# Patient Record
Sex: FEMALE | Race: WHITE
[De-identification: names, ages, dates, MRNs, and addresses within clinical notes are randomized per-mention and may not be internally consistent; named-entity substitution may affect disease eponyms.]

---

## 2018-09-05 ENCOUNTER — HOSPITAL ENCOUNTER (EMERGENCY)
Age: 23
Discharge: HOME | End: 2018-09-05

## 2018-09-05 ENCOUNTER — HOSPITAL ENCOUNTER (EMERGENCY)
Dept: HOSPITAL 91 - FTE | Age: 23
Discharge: HOME | End: 2018-09-05
Payer: MEDICAID

## 2018-09-05 DIAGNOSIS — O23.41: ICD-10-CM

## 2018-09-05 DIAGNOSIS — Z3A.01: ICD-10-CM

## 2018-09-05 DIAGNOSIS — O26.891: Primary | ICD-10-CM

## 2018-09-05 DIAGNOSIS — R10.2: ICD-10-CM

## 2018-09-05 LAB
ADD MAN DIFF?: NO
ADD UMIC: YES
BASOPHIL #: 0 10^3/UL (ref 0–0.1)
BASOPHILS %: 0.5 % (ref 0–2)
EOSINOPHILS #: 0.1 10^3/UL (ref 0–0.5)
EOSINOPHILS %: 1.4 % (ref 0–7)
HEMATOCRIT: 38.3 % (ref 37–47)
HEMOGLOBIN: 12.4 G/DL (ref 12–16)
IMMATURE GRANS #M: 0.02 10^3/UL (ref 0–0.03)
IMMATURE GRANS % (M): 0.2 % (ref 0–0.43)
INR: 0.92
LYMPHOCYTES #: 3.6 10^3/UL (ref 0.8–2.9)
LYMPHOCYTES %: 41.3 % (ref 15–51)
MEAN CORPUSCULAR HEMOGLOBIN: 30 PG (ref 29–33)
MEAN CORPUSCULAR HGB CONC: 32.4 G/DL (ref 32–37)
MEAN CORPUSCULAR VOLUME: 92.5 FL (ref 82–101)
MEAN PLATELET VOLUME: 10.9 FL (ref 7.4–10.4)
MONOCYTE #: 1 10^3/UL (ref 0.3–0.9)
MONOCYTES %: 11 % (ref 0–11)
NEUTROPHIL #: 3.9 10^3/UL (ref 1.6–7.5)
NEUTROPHILS %: 45.6 % (ref 39–77)
NUCLEATED RED BLOOD CELLS #: 0 10^3/UL (ref 0–0)
NUCLEATED RED BLOOD CELLS%: 0 /100WBC (ref 0–0)
PARTIAL THROMBOPLASTIN TIME: 27.4 SEC (ref 25–35)
PLATELET COUNT: 310 10^3/UL (ref 140–415)
PROTIME: 12.4 SEC (ref 11.9–14.9)
PT RATIO: 1
RED BLOOD COUNT: 4.14 10^6/UL (ref 4.2–5.4)
RED CELL DISTRIBUTION WIDTH: 13.1 % (ref 11.5–14.5)
UR ASCORBIC ACID: NEGATIVE MG/DL
UR BACTERIA: (no result) /HPF
UR BILIRUBIN (DIP): NEGATIVE MG/DL
UR BLOOD (DIP): (no result) MG/DL
UR CLARITY: (no result)
UR COLOR: YELLOW
UR GLUCOSE (DIP): NEGATIVE MG/DL
UR KETONES (DIP): (no result) MG/DL
UR LEUKOCYTE ESTERASE (DIP): (no result) LEU/UL
UR MUCUS: (no result) /HPF
UR NITRITE (DIP): NEGATIVE MG/DL
UR PH (DIP): 5 (ref 5–9)
UR RBC: 5 /HPF (ref 0–5)
UR SPECIFIC GRAVITY (DIP): 1.02 (ref 1–1.03)
UR SQUAMOUS EPITHELIAL CELL: (no result) /HPF
UR TOTAL PROTEIN (DIP): NEGATIVE MG/DL
UR UROBILINOGEN (DIP): NEGATIVE MG/DL
UR WBC: 27 /HPF (ref 0–5)
WHITE BLOOD COUNT: 8.6 10^3/UL (ref 4.8–10.8)

## 2018-09-05 PROCEDURE — 84702 CHORIONIC GONADOTROPIN TEST: CPT

## 2018-09-05 PROCEDURE — 87086 URINE CULTURE/COLONY COUNT: CPT

## 2018-09-05 PROCEDURE — 36415 COLL VENOUS BLD VENIPUNCTURE: CPT

## 2018-09-05 PROCEDURE — 86901 BLOOD TYPING SEROLOGIC RH(D): CPT

## 2018-09-05 PROCEDURE — 86900 BLOOD TYPING SEROLOGIC ABO: CPT

## 2018-09-05 PROCEDURE — 85730 THROMBOPLASTIN TIME PARTIAL: CPT

## 2018-09-05 PROCEDURE — 99284 EMERGENCY DEPT VISIT MOD MDM: CPT

## 2018-09-05 PROCEDURE — 85610 PROTHROMBIN TIME: CPT

## 2018-09-05 PROCEDURE — 81001 URINALYSIS AUTO W/SCOPE: CPT

## 2018-09-05 PROCEDURE — 76801 OB US < 14 WKS SINGLE FETUS: CPT

## 2018-09-05 PROCEDURE — 85025 COMPLETE CBC W/AUTO DIFF WBC: CPT

## 2018-09-05 RX ADMIN — ACETAMINOPHEN 1 MG: 325 TABLET, FILM COATED ORAL at 20:24

## 2018-09-05 RX ADMIN — METOCLOPRAMIDE HYDROCHLORIDE 1 MG: 10 TABLET ORAL at 21:10

## 2018-09-08 ENCOUNTER — HOSPITAL ENCOUNTER (INPATIENT)
Dept: HOSPITAL 91 - PP2 | Age: 23
LOS: 5 days | Discharge: HOME | DRG: 781 | End: 2018-09-13
Payer: MEDICAID

## 2018-09-08 DIAGNOSIS — O21.1: Primary | ICD-10-CM

## 2018-09-08 DIAGNOSIS — E86.0: ICD-10-CM

## 2018-09-08 DIAGNOSIS — O99.351: ICD-10-CM

## 2018-09-08 DIAGNOSIS — G43.909: ICD-10-CM

## 2018-09-08 DIAGNOSIS — Z3A.01: ICD-10-CM

## 2018-09-08 DIAGNOSIS — R94.6: ICD-10-CM

## 2018-09-08 LAB
ADD UMIC: NO
ANION GAP: 13 (ref 8–16)
BLOOD UREA NITROGEN: 8 MG/DL (ref 7–20)
CALCIUM: 9.5 MG/DL (ref 8.4–10.2)
CARBON DIOXIDE: 26 MMOL/L (ref 21–31)
CHLORIDE: 102 MMOL/L (ref 97–110)
CREATININE: 0.41 MG/DL (ref 0.44–1)
FREE T3: 3.89 PG/ML (ref 2.77–5.27)
FREE THYROXINE INDEX (CALC): 4.06 UG/ML (ref 0.65–3.89)
GLUCOSE: 80 MG/DL (ref 70–220)
POTASSIUM: 4 MMOL/L (ref 3.5–5.1)
SODIUM: 137 MMOL/L (ref 135–144)
T3 UPTAKE: 25.4 % (ref 23.5–40.5)
T4 (THYROXINE): 16 UG/DL (ref 5.5–11)
THYROID STIMULATING HORMONE: 0.07 MIU/L (ref 0.47–4.68)
UR ASCORBIC ACID: NEGATIVE MG/DL
UR BILIRUBIN (DIP): NEGATIVE MG/DL
UR BLOOD (DIP): NEGATIVE MG/DL
UR CLARITY: CLEAR
UR COLOR: YELLOW
UR GLUCOSE (DIP): NEGATIVE MG/DL
UR KETONES (DIP): (no result) MG/DL
UR LEUKOCYTE ESTERASE (DIP): NEGATIVE LEU/UL
UR NITRITE (DIP): NEGATIVE MG/DL
UR PH (DIP): 7 (ref 5–9)
UR SPECIFIC GRAVITY (DIP): 1.01 (ref 1–1.03)
UR TOTAL PROTEIN (DIP): NEGATIVE MG/DL
UR UROBILINOGEN (DIP): NEGATIVE MG/DL

## 2018-09-08 PROCEDURE — 80053 COMPREHEN METABOLIC PANEL: CPT

## 2018-09-08 PROCEDURE — 84439 ASSAY OF FREE THYROXINE: CPT

## 2018-09-08 PROCEDURE — 86376 MICROSOMAL ANTIBODY EACH: CPT

## 2018-09-08 PROCEDURE — 81003 URINALYSIS AUTO W/O SCOPE: CPT

## 2018-09-08 PROCEDURE — 99217: CPT

## 2018-09-08 PROCEDURE — 86800 THYROGLOBULIN ANTIBODY: CPT

## 2018-09-08 PROCEDURE — 84436 ASSAY OF TOTAL THYROXINE: CPT

## 2018-09-08 PROCEDURE — 80048 BASIC METABOLIC PNL TOTAL CA: CPT

## 2018-09-08 PROCEDURE — 84481 FREE ASSAY (FT-3): CPT

## 2018-09-08 PROCEDURE — 84479 ASSAY OF THYROID (T3 OR T4): CPT

## 2018-09-08 PROCEDURE — 87522 HEPATITIS C REVRS TRNSCRPJ: CPT

## 2018-09-08 PROCEDURE — 84443 ASSAY THYROID STIM HORMONE: CPT

## 2018-09-08 PROCEDURE — 84235 ASSAY OF ENDOCRINE HORMONE: CPT

## 2018-09-08 RX ADMIN — DEXTROSE, SODIUM CHLORIDE, AND POTASSIUM CHLORIDE 1 MLS/HR: 5; .45; .15 INJECTION INTRAVENOUS at 13:44

## 2018-09-08 RX ADMIN — FAMOTIDINE 1 MG: 10 INJECTION, SOLUTION INTRAVENOUS at 15:34

## 2018-09-08 RX ADMIN — FAMOTIDINE 1 MG: 10 INJECTION, SOLUTION INTRAVENOUS at 23:46

## 2018-09-08 RX ADMIN — PYRIDOXINE HCL TAB 50 MG 1 MG: 50 TAB at 23:46

## 2018-09-08 RX ADMIN — ONDANSETRON HYDROCHLORIDE 1 MG: 2 INJECTION, SOLUTION INTRAMUSCULAR; INTRAVENOUS at 21:56

## 2018-09-08 RX ADMIN — DEXTROSE, SODIUM CHLORIDE, AND POTASSIUM CHLORIDE 1 MLS/HR: 5; .45; .15 INJECTION INTRAVENOUS at 21:52

## 2018-09-08 RX ADMIN — FAMOTIDINE 1 MG: 10 INJECTION, SOLUTION INTRAVENOUS at 18:54

## 2018-09-09 RX ADMIN — FAMOTIDINE 1 MG: 10 INJECTION, SOLUTION INTRAVENOUS at 05:59

## 2018-09-09 RX ADMIN — METOCLOPRAMIDE HYDROCHLORIDE 1 MG: 10 INJECTION, SOLUTION INTRAMUSCULAR; INTRAVENOUS at 18:10

## 2018-09-09 RX ADMIN — PYRIDOXINE HCL TAB 50 MG 1 MG: 50 TAB at 18:10

## 2018-09-09 RX ADMIN — THIAMINE HYDROCHLORIDE 1 MLS/HR: 100 INJECTION, SOLUTION INTRAMUSCULAR; INTRAVENOUS at 09:52

## 2018-09-09 RX ADMIN — DEXTROSE, SODIUM CHLORIDE, AND POTASSIUM CHLORIDE 1 MLS/HR: 5; .45; .15 INJECTION INTRAVENOUS at 12:34

## 2018-09-09 RX ADMIN — PYRIDOXINE HCL TAB 50 MG 1 MG: 50 TAB at 12:34

## 2018-09-09 RX ADMIN — METOCLOPRAMIDE HYDROCHLORIDE 1 MG: 10 INJECTION, SOLUTION INTRAMUSCULAR; INTRAVENOUS at 12:36

## 2018-09-09 RX ADMIN — DEXTROSE, SODIUM CHLORIDE, AND POTASSIUM CHLORIDE 1 MLS/HR: 5; .45; .15 INJECTION INTRAVENOUS at 05:59

## 2018-09-09 RX ADMIN — METOCLOPRAMIDE HYDROCHLORIDE 1 MG: 10 INJECTION, SOLUTION INTRAMUSCULAR; INTRAVENOUS at 02:35

## 2018-09-09 RX ADMIN — FAMOTIDINE 1 MG: 10 INJECTION, SOLUTION INTRAVENOUS at 18:10

## 2018-09-09 RX ADMIN — PYRIDOXINE HCL TAB 50 MG 1 MG: 50 TAB at 06:00

## 2018-09-09 RX ADMIN — SODIUM CHLORIDE 1 MLS/HR: 9 INJECTION, SOLUTION INTRAVENOUS at 09:52

## 2018-09-09 RX ADMIN — FAMOTIDINE 1 MG: 10 INJECTION, SOLUTION INTRAVENOUS at 12:33

## 2018-09-10 ENCOUNTER — HOSPITAL ENCOUNTER (INPATIENT)
Age: 23
LOS: 3 days | Discharge: HOME | DRG: 781 | End: 2018-09-13

## 2018-09-10 LAB
ALANINE AMINOTRANSFERASE: 37 IU/L (ref 13–69)
ALBUMIN/GLOBULIN RATIO: 1
ALBUMIN: 3.3 G/DL (ref 3.3–4.9)
ALKALINE PHOSPHATASE: 63 IU/L (ref 42–121)
ANION GAP: 12 (ref 8–16)
ASPARTATE AMINO TRANSFERASE: 24 IU/L (ref 15–46)
BILIRUBIN,DIRECT: 0 MG/DL (ref 0–0.2)
BILIRUBIN,TOTAL: 0.2 MG/DL (ref 0.2–1.3)
BLOOD UREA NITROGEN: < 2 MG/DL (ref 7–20)
CALCIUM: 8.9 MG/DL (ref 8.4–10.2)
CARBON DIOXIDE: 25 MMOL/L (ref 21–31)
CHLORIDE: 104 MMOL/L (ref 97–110)
CREATININE: 0.44 MG/DL (ref 0.44–1)
GLOBULIN: 3.3 G/DL (ref 1.3–3.2)
GLUCOSE: 100 MG/DL (ref 70–220)
POTASSIUM: 3.9 MMOL/L (ref 3.5–5.1)
SODIUM: 137 MMOL/L (ref 135–144)
TOTAL PROTEIN: 6.6 G/DL (ref 6.1–8.1)

## 2018-09-10 RX ADMIN — METOCLOPRAMIDE HYDROCHLORIDE 1 MG: 10 INJECTION, SOLUTION INTRAMUSCULAR; INTRAVENOUS at 19:25

## 2018-09-10 RX ADMIN — FAMOTIDINE 1 MG: 10 INJECTION, SOLUTION INTRAVENOUS at 00:09

## 2018-09-10 RX ADMIN — DEXTROSE, SODIUM CHLORIDE, AND POTASSIUM CHLORIDE 1 MLS/HR: 5; .45; .15 INJECTION INTRAVENOUS at 04:58

## 2018-09-10 RX ADMIN — PYRIDOXINE HCL TAB 50 MG 1 MG: 50 TAB at 11:45

## 2018-09-10 RX ADMIN — FAMOTIDINE 1 MG: 10 INJECTION, SOLUTION INTRAVENOUS at 05:41

## 2018-09-10 RX ADMIN — PYRIDOXINE HCL TAB 50 MG 1 MG: 50 TAB at 18:00

## 2018-09-10 RX ADMIN — PYRIDOXINE HCL TAB 50 MG 1 MG: 50 TAB at 00:09

## 2018-09-10 RX ADMIN — PYRIDOXINE HCL TAB 50 MG 1 MG: 50 TAB at 05:41

## 2018-09-10 RX ADMIN — DEXTROSE, SODIUM CHLORIDE, AND POTASSIUM CHLORIDE 1 MLS/HR: 5; .45; .15 INJECTION INTRAVENOUS at 12:58

## 2018-09-10 RX ADMIN — ONDANSETRON HYDROCHLORIDE 1 MG: 2 INJECTION, SOLUTION INTRAMUSCULAR; INTRAVENOUS at 21:14

## 2018-09-10 RX ADMIN — SODIUM CHLORIDE 1 MLS/HR: 9 INJECTION, SOLUTION INTRAVENOUS at 09:37

## 2018-09-10 RX ADMIN — DEXTROSE, SODIUM CHLORIDE, AND POTASSIUM CHLORIDE 1 MLS/HR: 5; .45; .15 INJECTION INTRAVENOUS at 21:14

## 2018-09-10 RX ADMIN — FAMOTIDINE 1 MG: 10 INJECTION, SOLUTION INTRAVENOUS at 19:25

## 2018-09-10 RX ADMIN — DEXTROSE, SODIUM CHLORIDE, AND POTASSIUM CHLORIDE 1 MLS/HR: 5; .45; .15 INJECTION INTRAVENOUS at 00:09

## 2018-09-10 RX ADMIN — FAMOTIDINE 1 MG: 10 INJECTION, SOLUTION INTRAVENOUS at 11:45

## 2018-09-10 RX ADMIN — THIAMINE HYDROCHLORIDE 1 MLS/HR: 100 INJECTION, SOLUTION INTRAMUSCULAR; INTRAVENOUS at 09:38

## 2018-09-11 LAB
FREE T3: 3.46 PG/ML (ref 2.77–5.27)
FREE T4 (FREE THYROXINE): 1.5 NG/DL (ref 0.79–2.35)
THYROGLOBULIN ANTIBODIES: 1 IU/ML
THYROID MICROSOMAL ANTIBODY: 7 IU/ML (ref ?–9)
THYROID STIMULATING HORMONE: 0.03 MIU/L (ref 0.47–4.68)

## 2018-09-11 RX ADMIN — FAMOTIDINE 1 MG: 10 INJECTION, SOLUTION INTRAVENOUS at 23:17

## 2018-09-11 RX ADMIN — DEXTROSE, SODIUM CHLORIDE, AND POTASSIUM CHLORIDE 1 MLS/HR: 5; .45; .15 INJECTION INTRAVENOUS at 23:17

## 2018-09-11 RX ADMIN — FAMOTIDINE 1 MG: 10 INJECTION, SOLUTION INTRAVENOUS at 17:29

## 2018-09-11 RX ADMIN — SODIUM CHLORIDE 1 MLS/HR: 9 INJECTION, SOLUTION INTRAVENOUS at 08:52

## 2018-09-11 RX ADMIN — FAMOTIDINE 1 MG: 10 INJECTION, SOLUTION INTRAVENOUS at 12:24

## 2018-09-11 RX ADMIN — PYRIDOXINE HCL TAB 50 MG 1 MG: 50 TAB at 00:24

## 2018-09-11 RX ADMIN — FAMOTIDINE 1 MG: 10 INJECTION, SOLUTION INTRAVENOUS at 00:24

## 2018-09-11 RX ADMIN — PYRIDOXINE HCL TAB 50 MG 1 MG: 50 TAB at 17:29

## 2018-09-11 RX ADMIN — THIAMINE HYDROCHLORIDE 1 MLS/HR: 100 INJECTION, SOLUTION INTRAMUSCULAR; INTRAVENOUS at 08:52

## 2018-09-11 RX ADMIN — DEXTROSE, SODIUM CHLORIDE, AND POTASSIUM CHLORIDE 1 MLS/HR: 5; .45; .15 INJECTION INTRAVENOUS at 12:58

## 2018-09-11 RX ADMIN — PYRIDOXINE HCL TAB 50 MG 1 MG: 50 TAB at 12:24

## 2018-09-11 RX ADMIN — DEXTROSE, SODIUM CHLORIDE, AND POTASSIUM CHLORIDE 1 MLS/HR: 5; .45; .15 INJECTION INTRAVENOUS at 05:18

## 2018-09-11 RX ADMIN — PYRIDOXINE HCL TAB 50 MG 1 MG: 50 TAB at 23:17

## 2018-09-11 RX ADMIN — PYRIDOXINE HCL TAB 50 MG 1 MG: 50 TAB at 05:18

## 2018-09-11 RX ADMIN — FAMOTIDINE 1 MG: 10 INJECTION, SOLUTION INTRAVENOUS at 05:18

## 2018-09-12 RX ADMIN — PYRIDOXINE HCL TAB 50 MG 1 MG: 50 TAB at 18:11

## 2018-09-12 RX ADMIN — PYRIDOXINE HCL TAB 50 MG 1 MG: 50 TAB at 12:32

## 2018-09-12 RX ADMIN — FAMOTIDINE 1 MG: 10 INJECTION, SOLUTION INTRAVENOUS at 12:32

## 2018-09-12 RX ADMIN — FAMOTIDINE 1 MG: 10 INJECTION, SOLUTION INTRAVENOUS at 18:11

## 2018-09-12 RX ADMIN — DEXTROSE, SODIUM CHLORIDE, AND POTASSIUM CHLORIDE 1 MLS/HR: 5; .45; .15 INJECTION INTRAVENOUS at 06:31

## 2018-09-12 RX ADMIN — FAMOTIDINE 1 MG: 10 INJECTION, SOLUTION INTRAVENOUS at 06:33

## 2018-09-12 RX ADMIN — PYRIDOXINE HCL TAB 50 MG 1 MG: 50 TAB at 06:31

## 2018-09-12 RX ADMIN — THIAMINE HYDROCHLORIDE 1 MLS/HR: 100 INJECTION, SOLUTION INTRAMUSCULAR; INTRAVENOUS at 08:25

## 2018-09-12 RX ADMIN — DEXTROSE, SODIUM CHLORIDE, AND POTASSIUM CHLORIDE 1 MLS/HR: 5; .45; .15 INJECTION INTRAVENOUS at 11:46

## 2018-09-12 RX ADMIN — DEXTROSE, SODIUM CHLORIDE, AND POTASSIUM CHLORIDE 1 MLS/HR: 5; .45; .15 INJECTION INTRAVENOUS at 18:50

## 2018-09-12 RX ADMIN — SODIUM CHLORIDE 1 MLS/HR: 9 INJECTION, SOLUTION INTRAVENOUS at 08:25

## 2018-09-13 RX ADMIN — PYRIDOXINE HCL TAB 50 MG 1 MG: 50 TAB at 00:48

## 2018-09-13 RX ADMIN — SODIUM CHLORIDE 1 MLS/HR: 9 INJECTION, SOLUTION INTRAVENOUS at 08:53

## 2018-09-13 RX ADMIN — FAMOTIDINE 1 MG: 10 INJECTION, SOLUTION INTRAVENOUS at 00:48

## 2018-09-13 RX ADMIN — THIAMINE HYDROCHLORIDE 1 MLS/HR: 100 INJECTION, SOLUTION INTRAMUSCULAR; INTRAVENOUS at 08:53

## 2018-09-13 RX ADMIN — FAMOTIDINE 1 MG: 10 INJECTION, SOLUTION INTRAVENOUS at 12:00

## 2018-09-13 RX ADMIN — FAMOTIDINE 1 MG: 10 INJECTION, SOLUTION INTRAVENOUS at 06:47

## 2018-09-13 RX ADMIN — PYRIDOXINE HCL TAB 50 MG 1 MG: 50 TAB at 12:00

## 2018-09-13 RX ADMIN — DEXTROSE, SODIUM CHLORIDE, AND POTASSIUM CHLORIDE 1 MLS/HR: 5; .45; .15 INJECTION INTRAVENOUS at 00:47

## 2018-09-13 RX ADMIN — DEXTROSE, SODIUM CHLORIDE, AND POTASSIUM CHLORIDE 1 MLS/HR: 5; .45; .15 INJECTION INTRAVENOUS at 12:57

## 2018-09-13 RX ADMIN — PYRIDOXINE HCL TAB 50 MG 1 MG: 50 TAB at 06:48

## 2018-09-13 RX ADMIN — DEXTROSE, SODIUM CHLORIDE, AND POTASSIUM CHLORIDE 1 MLS/HR: 5; .45; .15 INJECTION INTRAVENOUS at 04:58

## 2018-09-15 LAB — TSH RECEPTOR ANTIBODY: 8

## 2019-02-22 ENCOUNTER — HOSPITAL ENCOUNTER (OUTPATIENT)
Dept: HOSPITAL 10 - L-D | Age: 24
Discharge: HOME | End: 2019-02-22
Attending: OBSTETRICS & GYNECOLOGY
Payer: MEDICAID

## 2019-02-22 ENCOUNTER — HOSPITAL ENCOUNTER (OUTPATIENT)
Dept: HOSPITAL 91 - OBT | Age: 24
Discharge: HOME | End: 2019-02-22
Payer: MEDICAID

## 2019-02-22 VITALS
BODY MASS INDEX: 30.15 KG/M2 | HEIGHT: 57 IN | BODY MASS INDEX: 30.15 KG/M2 | WEIGHT: 139.77 LBS | HEIGHT: 57 IN | WEIGHT: 139.77 LBS

## 2019-02-22 VITALS — SYSTOLIC BLOOD PRESSURE: 97 MMHG | DIASTOLIC BLOOD PRESSURE: 54 MMHG | HEART RATE: 70 BPM | RESPIRATION RATE: 18 BRPM

## 2019-02-22 DIAGNOSIS — O09.293: Primary | ICD-10-CM

## 2019-02-22 DIAGNOSIS — Z3A.32: ICD-10-CM

## 2019-02-22 PROCEDURE — 76818 FETAL BIOPHYS PROFILE W/NST: CPT

## 2019-02-22 PROCEDURE — G0463 HOSPITAL OUTPT CLINIC VISIT: HCPCS

## 2019-02-22 NOTE — TRIAGE
===================================

OB Triage

===================================

Datetime Report Generated by CPN: 02/22/2019 21:27

   

   

===========================

Datetime: 02/22/2019 19:48

===========================

   

 Stage of Pregnancy:  OB Triage

 Monitor Mode:  External

 Quality:  Mild

 Pattern:  Normal: <= 5 Contractions in 10 Minutes

 Resting Tone [place]:  Relaxed

   

===================================

Fetal Heart Rate

===================================

   

 FHR Baseline Rate:  120

 Monitor Mode:  External US

 FHR Baseline Changes:  No Baseline Change

 Variability:  Moderate 6-25 bpm

 Accelerations:  15X15

 Decelerations:  None

 Category:  Category I

   

===========================

Datetime: 02/22/2019 19:18

===========================

   

 Stage of Pregnancy:  OB Triage

   

===================================

Labor Evaluation

===================================

   

 Frequency:  3-8

 Monitor Mode:  External

 Duration (sec)2399:  30-40

 Quality:  Mild

 Pattern:  Normal: <= 5 Contractions in 10 Minutes

 Resting Tone [place]:  Relaxed

   

===================================

Fetal Heart Rate

===================================

   

 FHR Baseline Rate:  130

 Monitor Mode:  External US

 FHR Baseline Changes:  No Baseline Change

 Variability:  Moderate 6-25 bpm

 Accelerations:  15X15

 Decelerations:  None

 Category:  Category I

   

===================================

Pain Assessment

===================================

   

 Pain Scale:  0

 Pain Presence:  None/Denies

 Pain Type:  N/A

 Pain Assessment Comments:  Pt denies discomfort or ucs

   

===========================

Datetime: 02/22/2019 18:49

===========================

   

   

===================================

Labor Evaluation

===================================

   

 Frequency:  x5

 Monitor Mode:  External

 Duration (sec)2399:  40-50

 Quality:  Mild

 Resting Tone [place]:  Relaxed

 Contraction Comments:  pt denies feeling feeling UCs

   

===================================

Fetal Heart Rate

===================================

   

 FHR Baseline Rate:  115

 Monitor Mode:  External US

 FHR Baseline Changes:  No Baseline Change

 Variability:  Moderate 6-25 bpm

 Accelerations:  15X15

 Decelerations:  None

 Category:  Category I

   

===========================

Datetime: 02/22/2019 17:49

===========================

   

 Stage of Pregnancy:  OB Triage

 Assessment Type:  Triage

   

===================================

Maternal Assessment

===================================

   

 Level of Consciousness:  Fully Conscious

 DTR's/Clonus:  DTRs 2+; No Clonus

 Headache:  Denies

 Blurred Vision:  No

 Respiratory Effort:  Unlabored; Regular Rhythm; Equal Expansion

 Breath Sounds, Left:  Clear and Equal

 Breath Sounds, Right:  Clear and Equal

 Nausea/Vomiting:  Denies

 RUQ Epigastric Pain:  Denies

 Lower Extremities Edema:  None

     Degree:  None

 Upper Extremities Edema:  None

     Degree:  None

 Facial Edema:  None

 Temperature Route:  Oral

   

===================================

Fall Risk Assessment

===================================

   

 History of Falling:  (0) No

 Secondary Diagnosis:  (0) No

 Ambulatory Aid:  (0) Bedrest/Nurse Assist

 IV Therapy:  (0) No

 Gait:  (0) Normal/Bedrest/Immobile

 Mental Status:  (0) Oriented to Own Ability

 Fall Score:  0

 Fall Risk Score Definition:  No Risk: No action required

   

===================================

Labor Evaluation

===================================

   

 Frequency:  x2

 Monitor Mode:  External

 Duration (sec)2399:  50-60

 Quality:  Mild

 Resting Tone [place]:  Relaxed

 Contraction Comments:  pt denies feeling UCs

   

===================================

Fetal Heart Rate

===================================

   

 FHR Baseline Rate:  115

 Monitor Mode:  External US

 FHR Baseline Changes:  No Baseline Change

 Variability:  Moderate 6-25 bpm

 Accelerations:  15X15

 Decelerations:  None

 Category:  Category I

   

===================================

Pain Assessment

===================================

   

 Pain Scale:  0

 Pain Presence:  None/Denies

 Pain Type:  N/A

   

===========================

Datetime: 02/22/2019 14:50

===========================

   

 Time of Arrival:  02/22/2019 16:54

 EGA:  32.3

 Arrived By:  Ambulatory

 Arrived From:   Office

 Chief Complaint:  pt is here for extended fetal monitoring due to the hx of FD at 6 month

 Fetal Movement:  Present

 Contractions:  Denies/Absent

 Rupture of Membranes:  Denies

 Vaginal Discharge:  Denies

 Recent Sexual Intercouse:  Denies

 Abdominal Trauma:  Not Applicable

 Patient Complaints:  None

 Time Provider Notified:  02/22/2019 17:58

 Provider Notified:  Dr Floyd

 Initial Plan:  OLGA

## 2019-02-22 NOTE — PN
Triage Information


Date/Time





Reason for visit:  Antepartum testing for history of fetal demise at 26 weeks


Weeks of Gestation


32 weeks and 3 days


/Para





Diabetes:  none


Hypertention:  none





Objective





Vital Signs


  Date      Temp  Pulse  Resp  B/P (MAP)   Pulse Ox  O2          O2 Flow    FiO2


Time                                                 Delivery    Rate


   19  98.1     70    18  97/54 (68)            Room Air


     17:38





Fetal Heart Rate:  140's


Contractions:  None





Results/Medications


Imaging Results


FINDINGS:


Single intrauterine gestation.


 


Presentation: Cephalic.


 


Placenta: Fundal 


No evidence of placental abruption.


No evidence of placenta previa.


 


Fetal breathing movement = 2/2


Fetal tone = 2/2


Fetal motion = 2/2


AGUILA = 2/2


 


AGUILA = 22.5 cm


Fetal heart rate: 120 beats per minute


 


 


IMPRESSION:


 


Single intrauterine gestation.


Biophysical profile  


Disposition:  Discharge


Assessment/Plan


 24 years old  with single intrauterine pregnancy at 32 weeks and 3 days


with a CLAY[place] of 2019 presents for antepartum testing due to history of fetal 


demise at 26+ weeks..She states good fetal movement.  She denies nausea, 


vomiting, shortness of breath, chest pain, abdominal pain, headache, visual 


changes, vaginal bleeding or LOF.        


-  FHR: No sign of fetal metabolic acidosis- Category I


-  Contractions: None


-Ultrasound performed as noted above


-  Continue antepartum testing 


-  Symptoms and sign of  labor, preeclampsia, fetal kick count discussed 


with patient, she voiced understanding.  All of her questions answered. 


-  Patient was discharged home in stable condition with the appropriate 


discharge instructions provided. I would like patient to have close follow-up 


with her primary physician or outpatient clinic in 1-2 days or return to triage 


for worsening symptoms or any other urgent concerns.











LUZ MARIA CABRERA                  2019 20:32

## 2019-02-23 NOTE — NSTRPT
===================================

NST Information

===================================

Datetime Report Generated by CPN: 02/23/2019 03:29

   

   

===========================

Datetime: 02/22/2019 14:49

===========================

   

   

===================================

NST Information

===================================

   

 EGA:  32.3

   

===========================

Datetime: 02/22/2019 14:20

===========================

   

 Test Number:  1

 Time on Monitor:  02/22/2019 15:10

 Time off Monitor:  02/22/2019 16:03

 NST Duration (Min):  53

 Reason for NST:  Previous Fetal Demise

 Test and Monitor Explained:  Monitor Explained; Test Explained; Verbalized Understanding

 Pulse:  68

 SBP:  93

 DBP:  52

   

===================================

Test Evaluation

===================================

   

 NST Interventions:  Reposition Patient; Acoustic Stimulation

 Patient States Fetal Movement:  Present

 Contraction Frequency:  x2

 FHR Baseline :  120

 Variability:  Moderate 6-25bpm

 Accelerations:  15X15

 Decelerations:  Variable

 FHR Category:  Category II

 NST Results:  Questionable





 Comments:  To u/s. Cephalic. AGUILA 16.5

   Per Dr Savage, Pt sent to triage for extended monitoring. Dr Rm informed. Orders received. 

   Phone report to ELIZ Mitchell RN, OB Triage

   

===================================

Electronically Signed By

===================================

   

 E-Signature:  Electronically signed by Poppy Savage MD on 2/23/2019 at 03:29  with User ID: [id number]

## 2019-03-12 ENCOUNTER — HOSPITAL ENCOUNTER (OUTPATIENT)
Dept: HOSPITAL 91 - OBT | Age: 24
Discharge: HOME | End: 2019-03-12
Payer: MEDICAID

## 2019-03-12 ENCOUNTER — HOSPITAL ENCOUNTER (OUTPATIENT)
Dept: HOSPITAL 10 - OBT | Age: 24
Discharge: HOME | End: 2019-03-12
Attending: OBSTETRICS & GYNECOLOGY
Payer: MEDICAID

## 2019-03-12 VITALS
WEIGHT: 116.84 LBS | BODY MASS INDEX: 23.56 KG/M2 | HEIGHT: 59 IN | HEIGHT: 59 IN | WEIGHT: 116.84 LBS | BODY MASS INDEX: 23.56 KG/M2

## 2019-03-12 DIAGNOSIS — O47.03: Primary | ICD-10-CM

## 2019-03-12 DIAGNOSIS — Z3A.35: ICD-10-CM

## 2019-03-12 LAB
ADD UMIC: YES
UR ASCORBIC ACID: 40 MG/DL
UR BACTERIA: (no result) /HPF
UR BILIRUBIN (DIP): NEGATIVE MG/DL
UR BLOOD (DIP): NEGATIVE MG/DL
UR CLARITY: (no result)
UR COLOR: YELLOW
UR GLUCOSE (DIP): NEGATIVE MG/DL
UR KETONES (DIP): (no result) MG/DL
UR LEUKOCYTE ESTERASE (DIP): (no result) LEU/UL
UR MUCUS: (no result) /HPF
UR NITRITE (DIP): NEGATIVE MG/DL
UR PH (DIP): 6 (ref 5–9)
UR RBC: 5 /HPF (ref 0–5)
UR SPECIFIC GRAVITY (DIP): 1.02 (ref 1–1.03)
UR SQUAMOUS EPITHELIAL CELL: (no result) /HPF
UR TOTAL PROTEIN (DIP): NEGATIVE MG/DL
UR UROBILINOGEN (DIP): NEGATIVE MG/DL
UR WBC: 33 /HPF (ref 0–5)

## 2019-03-12 PROCEDURE — 96360 HYDRATION IV INFUSION INIT: CPT

## 2019-03-12 PROCEDURE — 96361 HYDRATE IV INFUSION ADD-ON: CPT

## 2019-03-12 PROCEDURE — 36415 COLL VENOUS BLD VENIPUNCTURE: CPT

## 2019-03-12 PROCEDURE — G0463 HOSPITAL OUTPT CLINIC VISIT: HCPCS

## 2019-03-12 PROCEDURE — 81001 URINALYSIS AUTO W/SCOPE: CPT

## 2019-03-12 RX ADMIN — PYRIDOXINE HYDROCHLORIDE 1 MLS/HR: 100 INJECTION, SOLUTION INTRAMUSCULAR; INTRAVENOUS at 11:32

## 2019-03-12 NOTE — TRIAGE
===================================

OB Triage

===================================

Datetime Report Generated by CPN: 03/12/2019 13:57

   

   

===========================

Datetime: 03/12/2019 13:42

===========================

   

 Stage of Pregnancy:  OB Triage

   

===================================

Maternal Assessment

===================================

   

 Level of Consciousness:  Fully Conscious

 DTR's/Clonus:  DTRs 1+

 Headache:  Denies

 Nausea/Vomiting:  Denies

 RUQ Epigastric Pain:  Denies

 Monitor Mode:  External

 Pattern:  Normal: <= 5 Contractions in 10 Minutes

 Resting Tone [place]:  Relaxed

   

===================================

Fetal Heart Rate

===================================

   

 FHR Baseline Rate:  130

 Monitor Mode:  External US

 Variability:  Moderate 6-25 bpm

 Accelerations:  15X15

 Decelerations:  None

 Category:  Category I

   

===================================

Pain Assessment

===================================

   

 Pain Scale:  0

 Pain Presence:  None/Denies

 Pain Type:  N/A

 Pain Location:  Back

 Pain Goal:  3

   

===================================

Vaginal Exam

===================================

   

 Membrane Status:  Intact

   

===========================

Datetime: 03/12/2019 13:00

===========================

   

 Stage of Pregnancy:  OB Triage

   

===================================

Maternal Assessment

===================================

   

 Level of Consciousness:  Fully Conscious

 DTR's/Clonus:  DTRs 1+

 Headache:  Denies

 Nausea/Vomiting:  Denies

 RUQ Epigastric Pain:  Denies

   

===================================

Labor Evaluation

===================================

   

 Frequency:  X2

      

 Monitor Mode:  External

 Duration (sec)2399:  50

 Quality:  Mild

 Pattern:  Normal: <= 5 Contractions in 10 Minutes

 Resting Tone [place]:  Relaxed

   

===================================

Fetal Heart Rate

===================================

   

 FHR Baseline Rate:  130

 Monitor Mode:  External US

 Variability:  Moderate 6-25 bpm

 Accelerations:  15X15

 Decelerations:  None

 Category:  Category I

   

===================================

Pain Assessment

===================================

   

 Pain Scale:  3

 Pain Presence:  Intermittent

 Pain Type:  Cramping

 Pain Location:  Back

 Pain Goal:  3

   

===================================

Vaginal Exam

===================================

   

 Membrane Status:  Intact

   

===========================

Datetime: 03/12/2019 12:00

===========================

   

 Stage of Pregnancy:  OB Triage

   

===================================

Maternal Assessment

===================================

   

 Level of Consciousness:  Fully Conscious

 DTR's/Clonus:  DTRs 1+

 Headache:  Denies

 Nausea/Vomiting:  Denies

 RUQ Epigastric Pain:  Denies

   

===================================

Labor Evaluation

===================================

   

 Frequency:  X3

 Monitor Mode:  External

 Duration (sec)2399:  50

 Quality:  Mild

 Pattern:  Normal: <= 5 Contractions in 10 Minutes

 Resting Tone [place]:  Relaxed

   

===================================

Fetal Heart Rate

===================================

   

 FHR Baseline Rate:  130

 Monitor Mode:  External US

 Variability:  Moderate 6-25 bpm

 Accelerations:  15X15

 Decelerations:  None

 Category:  Category I

   

===================================

Pain Assessment

===================================

   

 Pain Scale:  3

 Pain Presence:  Intermittent

 Pain Type:  Cramping

 Pain Location:  Back

 Pain Goal:  3

   

===================================

Vaginal Exam

===================================

   

 Membrane Status:  Intact

   

===========================

Datetime: 03/12/2019 11:01

===========================

   

   

===================================

Maternal Assessment

===================================

   

 Level of Consciousness:  Fully Conscious

 DTR's/Clonus:  DTRs 1+

 Headache:  Denies

 Blurred Vision:  No

 Nausea/Vomiting:  Denies

 RUQ Epigastric Pain:  Denies

 Facial Edema:  None

   

===================================

Labor Evaluation

===================================

   

 Frequency:  X1

 Monitor Mode:  External

 Duration (sec)2399:  50

 Quality:  Mild

 Pattern:  Normal: <= 5 Contractions in 10 Minutes

 Resting Tone [place]:  Relaxed

   

===================================

Fetal Heart Rate

===================================

   

 FHR Baseline Rate:  130

 Monitor Mode:  External US

 Variability:  Moderate 6-25 bpm

 Accelerations:  15X15

 Decelerations:  None

 Category:  Category I

   

===================================

Pain Assessment

===================================

   

 Pain Scale:  3

 Pain Presence:  Intermittent

 Pain Type:  Cramping

 Pain Location:  Back

 Pain Goal:  3

   

===================================

Vaginal Exam

===================================

   

 Membrane Status:  Intact

   

===========================

Datetime: 03/12/2019 10:45

===========================

   

 Assessment Type:  Triage

   

===================================

Maternal Assessment

===================================

   

 Level of Consciousness:  Fully Conscious

 DTR's/Clonus:  DTRs 2+; No Clonus

 Headache:  Denies

 Blurred Vision:  No

 Respiratory Effort:  Unlabored; Regular Rhythm; Equal Expansion

 Breath Sounds, Left:  Clear and Equal

 Breath Sounds, Right:  Clear and Equal

 Nausea/Vomiting:  Denies

 RUQ Epigastric Pain:  Denies

 Lower Extremities Edema:  None

     Degree:  None

 Upper Extremities Edema:  None

     Degree:  None

 Facial Edema:  None

   

===================================

Fall Risk Assessment

===================================

   

 History of Falling:  (0) No

 Secondary Diagnosis:  (0) No

 Ambulatory Aid:  (0) Bedrest/Nurse Assist

 IV Therapy:  (0) No

 Gait:  (0) Normal/Bedrest/Immobile

 Mental Status:  (0) Oriented to Own Ability

 Fall Score:  0

 Fall Risk Score Definition:  No Risk: No action required

   

===========================

Datetime: 03/12/2019 10:42

===========================

   

 Time of Arrival:  03/12/2019 10:44

 EGA:  35.0

 Arrived By:  Ambulatory

 Arrived From:  Home

 Chief Complaint:  PT CAME IN FROM NST DUE TO UC'S PT STATES FEELING THEM

 Fetal Movement:  Present

 Contractions:  Irregular

 Time Contractions Began:  03/12/2019 08:00

 Contractions:  5-8

 Rupture of Membranes:  Denies

 Vaginal Discharge:  Denies

 Recent Sexual Intercouse:  Denies

 Abdominal Trauma:  Not Applicable

 Additional Patient Complaints:  NONE

 Time Provider Notified:  03/12/2019 10:45

 Provider Notified:  FORD

 Initial Plan:  LR HYDRATION

   

===========================

Datetime: 02/22/2019 17:49

===========================

   

 Fall Score:  0

 Fall Risk Score Definition:  No Risk: No action required

   

===========================

Datetime: 02/22/2019 14:50

===========================

   

 EGA:  32.3

## 2019-03-12 NOTE — PN
Triage Information


Date/Time





Reason for visit:  Uterine contractions


Weeks of Gestation


35+


/Para


n/a


Diabetes:  none


Hypertention:  none





Objective


Fetal Heart Rate:  140's


Contractions:  None





Results/Medications


Results 24 hrs





Laboratory Tests


                 Test
                            3/12/19
10:54


                 Urine Color                      YELLOW


                 Urine Clarity                    CLOUDY  A


                 Urine pH                                 6.0


                 Urine Specific Gravity                 1.019


                 Urine Ketones                    TRACE  A


                 Urine Nitrite                    NEGATIVE


                 Urine Bilirubin                  NEGATIVE


                 Urine Urobilinogen               NEGATIVE


                 Urine Leukocyte Esterase                 1+  H


                 Urine Microscopic RBC                      5


                 Urine Microscopic WBC                    33  H


                 Urine Squamous Epithelial
Cells  FEW  



                 Urine Bacteria                   FEW  A


                 Urine Mucus                      MANY  A


                 Urine Hemoglobin                 NEGATIVE


                 Urine Glucose                    NEGATIVE


                 Urine Total Protein              NEGATIVE





Medications





Current Medications


Lactated Ringer's 1,000 ml @  250 mls/hr Q4H IV  Last administered on 3/12/19at 


11:32; Admin Dose 250 MLS/HR;  Start 3/12/19 at 11:10


Disposition:  Discharge


Assessment/Plan


CX clsoed 


received IV hydration


BPP 10/10


Discharged with precautions


Questions answered


Follow up with provider











MERLYN DE DIOS M.D.            Mar 12, 2019 13:29

## 2019-03-16 ENCOUNTER — HOSPITAL ENCOUNTER (OUTPATIENT)
Dept: HOSPITAL 10 - OBT | Age: 24
Discharge: HOME | End: 2019-03-16
Attending: OBSTETRICS & GYNECOLOGY
Payer: MEDICAID

## 2019-03-16 ENCOUNTER — HOSPITAL ENCOUNTER (OUTPATIENT)
Dept: HOSPITAL 91 - OBT | Age: 24
Discharge: HOME | End: 2019-03-16
Payer: MEDICAID

## 2019-03-16 VITALS — DIASTOLIC BLOOD PRESSURE: 57 MMHG | SYSTOLIC BLOOD PRESSURE: 104 MMHG | RESPIRATION RATE: 18 BRPM | HEART RATE: 88 BPM

## 2019-03-16 VITALS
WEIGHT: 143.52 LBS | WEIGHT: 143.52 LBS | HEIGHT: 59 IN | BODY MASS INDEX: 28.93 KG/M2 | BODY MASS INDEX: 28.93 KG/M2 | HEIGHT: 59 IN

## 2019-03-16 DIAGNOSIS — O36.8130: Primary | ICD-10-CM

## 2019-03-16 DIAGNOSIS — Z3A.35: ICD-10-CM

## 2019-03-16 PROCEDURE — 76818 FETAL BIOPHYS PROFILE W/NST: CPT

## 2019-03-16 PROCEDURE — G0463 HOSPITAL OUTPT CLINIC VISIT: HCPCS

## 2019-03-16 NOTE — TRIAGE
===================================

OB Triage

===================================

Datetime Report Generated by CPN: 03/16/2019 14:39

   

   

===========================

Datetime: 03/16/2019 14:00

===========================

   

 Stage of Pregnancy:  OB Triage

   

===================================

Maternal Assessment

===================================

   

 Level of Consciousness:  Fully Conscious

   

===================================

Labor Evaluation

===================================

   

 Frequency:  NONE

 Monitor Mode:  External

 Resting Tone [place]:  Relaxed

   

===================================

Fetal Heart Rate

===================================

   

 FHR Baseline Rate:  125

 Monitor Mode:  External US

 Variability:  Moderate 6-25 bpm

 Accelerations:  15X15

 Decelerations:  None

 Category:  Category I

   

===================================

Pain Assessment

===================================

   

 Pain Scale:  0

 Pain Goal:  3

   

===================================

Vaginal Exam

===================================

   

 Membrane Status:  Intact

 Vaginal Bleeding:  None

   

===========================

Datetime: 03/16/2019 13:00

===========================

   

 Stage of Pregnancy:  OB Triage

   

===================================

Maternal Assessment

===================================

   

 Level of Consciousness:  Fully Conscious

   

===================================

Labor Evaluation

===================================

   

 Frequency:  NONE

 Monitor Mode:  External

 Resting Tone [place]:  Relaxed

   

===================================

Fetal Heart Rate

===================================

   

 FHR Baseline Rate:  135

 Monitor Mode:  External US

 Variability:  Moderate 6-25 bpm

 Accelerations:  15X15

 Decelerations:  None

 Category:  Category I

   

===================================

Pain Assessment

===================================

   

 Pain Scale:  0

 Pain Goal:  3

   

===================================

Vaginal Exam

===================================

   

 Membrane Status:  Intact

 Vaginal Bleeding:  None

   

===========================

Datetime: 03/16/2019 12:00

===========================

   

 Stage of Pregnancy:  OB Triage

   

===================================

Maternal Assessment

===================================

   

 Level of Consciousness:  Fully Conscious

   

===================================

Labor Evaluation

===================================

   

 Frequency:  NONE

 Monitor Mode:  External

 Resting Tone [place]:  Relaxed

   

===================================

Fetal Heart Rate

===================================

   

 FHR Baseline Rate:  135

 Monitor Mode:  External US

 Variability:  Moderate 6-25 bpm

 Accelerations:  15X15

 Decelerations:  None

 Category:  Category I

   

===================================

Pain Assessment

===================================

   

 Pain Scale:  0

 Pain Goal:  3

   

===================================

Vaginal Exam

===================================

   

 Membrane Status:  Intact

 Vaginal Bleeding:  None

   

===========================

Datetime: 03/16/2019 11:16

===========================

   

 Assessment Type:  Triage

   

===================================

Maternal Assessment

===================================

   

 Level of Consciousness:  Fully Conscious

 DTR's/Clonus:  DTRs 2+; No Clonus

 Headache:  Denies

 Blurred Vision:  No

 Respiratory Effort:  Unlabored; Regular Rhythm; Equal Expansion

 Breath Sounds, Left:  Clear and Equal

 Breath Sounds, Right:  Clear and Equal

 Nausea/Vomiting:  Denies

 RUQ Epigastric Pain:  Denies

 Lower Extremities Edema:  None

     Degree:  None

 Upper Extremities Edema:  None

     Degree:  None

 Facial Edema:  None

   

===================================

Fall Risk Assessment

===================================

   

 History of Falling:  (0) No

 Secondary Diagnosis:  (0) No

 Ambulatory Aid:  (0) Bedrest/Nurse Assist

 IV Therapy:  (0) No

 Gait:  (0) Normal/Bedrest/Immobile

 Mental Status:  (0) Oriented to Own Ability

 Fall Score:  0

 Fall Risk Score Definition:  No Risk: No action required

   

===========================

Datetime: 03/16/2019 11:14

===========================

   

 Time of Arrival:  03/16/2019 10:41

 EGA:  35.4

 Arrived By:  Ambulatory

 Arrived From:  Home

 Chief Complaint:  PT HERE FOR NST/BPP FOR H/O IUFD. 

 Fetal Movement:  Present

 Contractions:  Denies/Absent

 Rupture of Membranes:  Denies

 Vaginal Bleeding:  None

 Vaginal Discharge:  Denies

 Recent Sexual Intercouse:  Denies

 Abdominal Trauma:  Not Applicable

 Patient Complaints:  None

 Time Provider Notified:  03/16/2019 11:36

 Provider Notified:  ESHAGHIAN

 Initial Plan:  BPP/NST

   

===========================

Datetime: 03/16/2019 11:04

===========================

   

 Monitor Mode:  External

 Monitor Mode:  External US

   

===========================

Datetime: 03/12/2019 10:45

===========================

   

 Fall Score:  0

 Fall Risk Score Definition:  No Risk: No action required

   

===========================

Datetime: 03/12/2019 10:42

===========================

   

 EGA:  35.0

   

===========================

Datetime: 02/22/2019 17:49

===========================

   

 Fall Score:  0

 Fall Risk Score Definition:  No Risk: No action required

   

===========================

Datetime: 02/22/2019 14:50

===========================

   

 EGA:  32.3

## 2019-03-16 NOTE — PN
Triage Information


Date/Time





 2019


Reason for visit:  


Weeks of Gestation


35 weeks and 4 days


/Para





Diabetes:  none


Hypertention:  none


Additional information


4-year-old  with IUP at 35 weeks and 4 days with history of IUFD presented 


to triage for NST/BPP due to decreased variability and NST in NST clinic 


yesterday.  The patient denies any leaking of fluid, vaginal bleeding decreased 


fetal movement or any other complaints.





Objective





Vital Signs


  Date      Temp  Pulse  Resp  B/P (MAP)   Pulse Ox  O2          O2 Flow    FiO2


Time                                                 Delivery    Rate


   3/16/19  99.1     88    18      104/57            Room Air


     11:22                           (73)





Fetal Heart Rate:  130's


Fetal Heart Rate Comments


Category 1 and reactive


Contractions:  None


Exam


General appearance: Alert and oriented x4 does not appear to be in any acute 


distress


 abdomen: Soft, gravid, fundal height consider gestational age





NST: Category 1


BPP: 


 AGUILA: 12.1





Results/Medications


Imaging Results


PROCEDURE:   US OB biophysical profile. 


 


CLINICAL INDICATION:    decreased fetal movements, contractions 


 


TECHNIQUE:   Multiple sonographic images of the pelvis were obtained.  The 


images were reviewed on a PACS workstation. 


 


COMPARISON:   No prior studies are available for comparison. 


 


FINDINGS:


 


There is a single live intrauterine gestation.  Cardiac activity is present with


150 beats per minute. 


There is a vertex presentation. 


The placenta is posterior.   There is no evidence of placental abruption.


There is a normal amount of amniotic fluid with an AGUILA = 12.1 cm.


 


Biophysical profile:


Fetal movement 2/2


Fetal tone 2/2.


Fetal breathing 2/2 


AGUILA 2/2


 


Total  


 


RPTAT: AA . 


 


IMPRESSION:


Normal biophysical profile.


Disposition:  Discharge


Assessment/Plan


IUP at 35 weeks and 4 days


History of IUFD being followed by NST BPP twice a week


Currently  testing reassuring 


patient was reassured





Advised to have follow-up with  testing twice a week


 labor precautions fetal kick count discussed


Follow-up with primary OB office within 24-48 hours after discharge from the 


hospital discussed


Patient verbalized understanding all questions were answered to patient's best 


satisfaction











ARPITA ALONSO MD              Mar 16, 2019 14:16

## 2019-04-12 ENCOUNTER — HOSPITAL ENCOUNTER (INPATIENT)
Dept: HOSPITAL 91 - PP1 | Age: 24
LOS: 3 days | Discharge: HOME | End: 2019-04-15
Payer: MEDICAID

## 2019-04-12 ENCOUNTER — HOSPITAL ENCOUNTER (INPATIENT)
Dept: HOSPITAL 10 - L-D | Age: 24
LOS: 3 days | Discharge: HOME | End: 2019-04-15
Attending: OBSTETRICS & GYNECOLOGY | Admitting: OBSTETRICS & GYNECOLOGY
Payer: MEDICAID

## 2019-04-12 VITALS
HEIGHT: 59 IN | WEIGHT: 150.36 LBS | BODY MASS INDEX: 30.31 KG/M2 | HEIGHT: 59 IN | BODY MASS INDEX: 30.31 KG/M2 | WEIGHT: 150.36 LBS

## 2019-04-12 DIAGNOSIS — Z3A.39: ICD-10-CM

## 2019-04-12 LAB
ADD MAN DIFF?: NO
BASOPHIL #: 0 10^3/UL (ref 0–0.1)
BASOPHILS %: 0.3 % (ref 0–2)
EOSINOPHILS #: 0.1 10^3/UL (ref 0–0.5)
EOSINOPHILS %: 0.8 % (ref 0–7)
HEMATOCRIT: 34.2 % (ref 37–47)
HEMOGLOBIN: 11.2 G/DL (ref 12–16)
HEPATITIS B SURFACE ANTIGEN: NEGATIVE
IMMATURE GRANS #M: 0.03 10^3/UL (ref 0–0.03)
IMMATURE GRANS % (M): 0.4 % (ref 0–0.43)
INR: 0.82
LYMPHOCYTES #: 2.5 10^3/UL (ref 0.8–2.9)
LYMPHOCYTES %: 34.2 % (ref 15–51)
MEAN CORPUSCULAR HEMOGLOBIN: 29.3 PG (ref 29–33)
MEAN CORPUSCULAR HGB CONC: 32.7 G/DL (ref 32–37)
MEAN CORPUSCULAR VOLUME: 89.5 FL (ref 82–101)
MEAN PLATELET VOLUME: 11.2 FL (ref 7.4–10.4)
MONOCYTE #: 0.9 10^3/UL (ref 0.3–0.9)
MONOCYTES %: 11.9 % (ref 0–11)
NEUTROPHIL #: 3.8 10^3/UL (ref 1.6–7.5)
NEUTROPHILS %: 52.4 % (ref 39–77)
NUCLEATED RED BLOOD CELLS #: 0 10^3/UL (ref 0–0)
NUCLEATED RED BLOOD CELLS%: 0 /100WBC (ref 0–0)
PARTIAL THROMBOPLASTIN TIME: 25.4 SEC (ref 23–35)
PLATELET COUNT: 237 10^3/UL (ref 140–415)
PROTIME: 11.4 SEC (ref 11.9–14.9)
PT RATIO: 0.9
RED BLOOD COUNT: 3.82 10^6/UL (ref 4.2–5.4)
RED CELL DISTRIBUTION WIDTH: 13.8 % (ref 11.5–14.5)
WHITE BLOOD COUNT: 7.3 10^3/UL (ref 4.8–10.8)

## 2019-04-12 PROCEDURE — 85025 COMPLETE CBC W/AUTO DIFF WBC: CPT

## 2019-04-12 PROCEDURE — 85610 PROTHROMBIN TIME: CPT

## 2019-04-12 PROCEDURE — 86850 RBC ANTIBODY SCREEN: CPT

## 2019-04-12 PROCEDURE — 87340 HEPATITIS B SURFACE AG IA: CPT

## 2019-04-12 PROCEDURE — 86592 SYPHILIS TEST NON-TREP QUAL: CPT

## 2019-04-12 PROCEDURE — 85730 THROMBOPLASTIN TIME PARTIAL: CPT

## 2019-04-12 PROCEDURE — 76815 OB US LIMITED FETUS(S): CPT

## 2019-04-12 PROCEDURE — 76818 FETAL BIOPHYS PROFILE W/NST: CPT

## 2019-04-12 PROCEDURE — 86900 BLOOD TYPING SEROLOGIC ABO: CPT

## 2019-04-12 PROCEDURE — 84443 ASSAY THYROID STIM HORMONE: CPT

## 2019-04-12 PROCEDURE — 90715 TDAP VACCINE 7 YRS/> IM: CPT

## 2019-04-12 PROCEDURE — 86901 BLOOD TYPING SEROLOGIC RH(D): CPT

## 2019-04-12 RX ADMIN — Medication 1 MLS/HR: at 22:56

## 2019-04-12 RX ADMIN — PYRIDOXINE HYDROCHLORIDE 1 MLS/HR: 100 INJECTION, SOLUTION INTRAMUSCULAR; INTRAVENOUS at 21:42

## 2019-04-12 RX ADMIN — PYRIDOXINE HYDROCHLORIDE SCH MLS/HR: 100 INJECTION, SOLUTION INTRAMUSCULAR; INTRAVENOUS at 21:42

## 2019-04-12 NOTE — HP
Date/Time of Note


Date/Time of Note


DATE: 19 


TIME: 22:11





OB - History


Hx of Present Pregnancy


Free Text/Dictation


24-year-old  3 para 2 at 39 weeks and 3 days of gestation with estimated 


date of delivery 2019


Patient presents with chief complaint of uterine contractions, she reports 


positive fetal movement, denies any vaginal bleeding or leaking fluid








GBS status is negative





Past obstetrical history significant for first pregnancy with intrauterine fetal


demise and second pregnancy normal spontaneous delivery at term


Both deliveries were in [facility]


Estimated Due Date:  2019


:  3


Para:  2


Prenatal Care:  Good Care





Past Family/Social History


*


Past Medical, Surgical, Family and Obstetric Histories reviewed from prenatal 


chart.





OB  Admission Exam


Physical Exam


HEENT:  WNL


Heart:  Rhythm Normal


Lungs:  Clear, Equal


Abdomen:  WNL


Extremities:  Normal


Reflexes:  Normal


Cervical Dilatation:  2cm


Effacement:  50%


Station:  -3


Membranes:  Intact


Fetal Heart Rate:  140's


Accelerations:  Accelerations Present


Decelerations:  No Decelerations


Varibility:  Moderate


Contractions on Admission:  >10 Minutes Apart


Intensity:  Moderate


Last 72 hours Lab Results


                                    CBC & BMP


19 21:35














                                        


PROCEDURE:   US OB. 


 


CLINICAL INDICATION:    Size and dates  


 


TECHNIQUE:   Multiple sonographic images of the pelvis and gravid uterus were 


obtained.  The images were reviewed on a PACS workstation. 


 


COMPARISON:   No prior studies are available for comparison. 


 


FINDINGS:


 


Gestation: Single live intrauterine gestation.


 


Cardiac activity: 150 beats per minute. 


 


Presentation: Vertex.


 


Placenta:


 


Location: Fundal right


 


Appearance: No previa or abruption.


 


Fetal Measurements:


 


BPD =   9.1 cm, 37 weeks and 1 day


HC =   33.4 cm, 38 weeks and 1 day


AC =   34.8 cm, 38 weeks and 5 days


FL =   7.2 cm, 37 weeks and 0 days


 


Gestational Age:


 


AUA estimated gestational age: 37 weeks 5 days


 


LMP estimated gestational age: 39 weeks 3 days


 


AUA estimated date of delivery: 19


 


The EFW = 3381 g, 38.1%ile based on LMP age.


 


RPTAT: AA


 


 


IMPRESSION:


 


Single live intrauterine gestation of 37 weeks 5 days by ultrasound criteria.


_____________________________________________ 


.Jose Freed MD, MD           Date    Time 


Electronically viewed and signed by .Jose Freed MD, MD on 2019 


22:15 


 


D:  2019 22:15  T:  2019 22:15


.S/





CC: SHAISTA ADAME MD





[phone]











                                        


PROCEDURE:   US OB biophysical profile. 


 


CLINICAL INDICATION:    decreased fetal movements,  


 


TECHNIQUE:   Multiple sonographic images of the pelvis were obtained.  The 


images were reviewed on a PACS workstation. 


 


COMPARISON:   No prior studies are available for comparison. 


 


FINDINGS:


 


There is a single live intrauterine gestation.  Cardiac activity is present with


142 beats per minute. 


There is a vertex presentation. 


The placenta is fundal right.   There is no evidence of placental abruption.


There is a normal amount of amniotic fluid with an AGUILA = 9.2 cm.


 


Biophysical profile:


Fetal movement 2/2


Fetal tone 2/2.


Fetal breathing 2/2 


AGUILA 2/2


 


Total  


 


RPTAT: AA . 


 


IMPRESSION:


Normal biophysical profile.  


 .


_____________________________________________ 


.Jose Frede MD, MD           Date    Time 


Electronically viewed and signed by .Jose Freed MD, MD on 2019 


22:16 


 


D:  2019 22:16  T:  2019 22:16


.S/





CC: SHAISTA ADAME MD





[phone]





OB  Assessment/Plan


Reason for admission:  active labor


Induction Method:  per Pitocin Protocol


Other plan:


Admit to labor and delivery


Oxytocin augmentation


Pain meds as needed





Copies To:


CC:   FARZAD BRITTON MD ;











SHAISTA ADAME MD             2019 22:15

## 2019-04-13 VITALS — SYSTOLIC BLOOD PRESSURE: 103 MMHG | RESPIRATION RATE: 18 BRPM | HEART RATE: 74 BPM | DIASTOLIC BLOOD PRESSURE: 68 MMHG

## 2019-04-13 VITALS — SYSTOLIC BLOOD PRESSURE: 97 MMHG | RESPIRATION RATE: 18 BRPM | HEART RATE: 59 BPM | DIASTOLIC BLOOD PRESSURE: 65 MMHG

## 2019-04-13 LAB
RAPID PLASMA REAGIN: NONREACTIVE
THYROID STIMULATING HORMONE: 2.06 MIU/L (ref 0.47–4.68)

## 2019-04-13 RX ADMIN — PYRIDOXINE HYDROCHLORIDE SCH MLS/HR: 100 INJECTION, SOLUTION INTRAMUSCULAR; INTRAVENOUS at 06:38

## 2019-04-13 RX ADMIN — PYRIDOXINE HYDROCHLORIDE 1 MLS/HR: 100 INJECTION, SOLUTION INTRAMUSCULAR; INTRAVENOUS at 04:27

## 2019-04-13 RX ADMIN — Medication 1 MLS/HR: at 15:09

## 2019-04-13 RX ADMIN — IBUPROFEN SCH MG: 600 TABLET ORAL at 18:00

## 2019-04-13 RX ADMIN — PYRIDOXINE HYDROCHLORIDE SCH MLS/HR: 100 INJECTION, SOLUTION INTRAMUSCULAR; INTRAVENOUS at 12:06

## 2019-04-13 RX ADMIN — Medication 1 SPRAY: at 18:03

## 2019-04-13 RX ADMIN — PYRIDOXINE HYDROCHLORIDE SCH MLS/HR: 100 INJECTION, SOLUTION INTRAMUSCULAR; INTRAVENOUS at 00:24

## 2019-04-13 RX ADMIN — PYRIDOXINE HYDROCHLORIDE 1 MLS/HR: 100 INJECTION, SOLUTION INTRAMUSCULAR; INTRAVENOUS at 00:24

## 2019-04-13 RX ADMIN — DOCUSATE SODIUM AND SENNOSIDES 1 TAB: 8.6; 5 TABLET, FILM COATED ORAL at 21:06

## 2019-04-13 RX ADMIN — Medication 1 APPLIC: at 18:03

## 2019-04-13 RX ADMIN — PYRIDOXINE HYDROCHLORIDE 1 MLS/HR: 100 INJECTION, SOLUTION INTRAMUSCULAR; INTRAVENOUS at 12:06

## 2019-04-13 RX ADMIN — IBUPROFEN 1 MG: 600 TABLET ORAL at 15:00

## 2019-04-13 RX ADMIN — WITCH HAZEL 1 PAD: 500 SOLUTION RECTAL; TOPICAL at 18:03

## 2019-04-13 RX ADMIN — FENTANYL CIT 0.2 MG/100ML-ROPIV 0.2%-NACL 0.9% EPIDURAL INJ 1 ML: 2/0.2 SOLUTION at 08:43

## 2019-04-13 RX ADMIN — DOCUSATE SODIUM AND SENNOSIDES SCH TAB: 8.6; 5 TABLET, FILM COATED ORAL at 21:06

## 2019-04-13 RX ADMIN — PYRIDOXINE HYDROCHLORIDE 1 MLS/HR: 100 INJECTION, SOLUTION INTRAMUSCULAR; INTRAVENOUS at 06:38

## 2019-04-13 RX ADMIN — PYRIDOXINE HYDROCHLORIDE SCH MLS/HR: 100 INJECTION, SOLUTION INTRAMUSCULAR; INTRAVENOUS at 04:27

## 2019-04-13 RX ADMIN — IBUPROFEN 1 MG: 600 TABLET ORAL at 18:00

## 2019-04-13 RX ADMIN — Medication 1 MLS/HR: at 13:21

## 2019-04-13 NOTE — LDN
Date/Time of Note


Date/Time of Note


DATE: 19 


TIME: 13:23





Delivery Summary


 of normal male infant


Weeks of Gestation


39w4d


Placenta Delivered:  Spontaneously, Intact & Complete


Meconium:  Thick


Episiotomy:  No


Perineal laceration:  0


Anesthesia type:  Epidural


Estimated blood loss:  100


Sponge & Needle done & correct:  Yes


All needle counts correct:  Yes


Any foreign bodies felt in the:  No





Infant Delivery Information


Sex


Infant Sex:  male





Apgars


1 Minute:  8


5 Minute:  9





Suctioning


Nose & mouth suctioned at wendy:  Yes


Delee suction performed:  Yes





Umbilical Cord


Umbilical cord with:  3 Vessels


Cord presentations:  no nuchal cord


Cord Blood was obtained:  Yes





Mother & Baby Disposition


Disposition


Mom & Baby to Maternity; Good:  Yes


Mom transferred to:  Other


Baby to NICU:  No (postpartum)











JOSE PARIKH MD                2019 13:25

## 2019-04-13 NOTE — PAC
Date/Time of Note


Date/Time of Note


DATE: 4/13/19 


TIME: 06:54





Post-Anesthesia Notes


Post-Anesthesia Note


Last documented vital signs


SAt 99%, HR 76 /56, RR 19 Temp 98.3


Activity:  WNL


Respiratory function:  WNL


Cardiovascular function:  WNL


Mental status:  Baseline


Pain reasonably controlled:  Yes


Hydration appropriate:  Yes


Nausea/Vomiting absent:  No











DYLAN BERGERON MD            Apr 13, 2019 06:55

## 2019-04-13 NOTE — PREAC
Date/Time of Note


Date/Time of Note


DATE: 4/13/19 


TIME: 00:06





Anesthesia Eval and Record


Evaluation


Time Pre-Procedure Interview


DATE: 4/13/19 


TIME: 00:06


Age


24


Sex


female


NPO:  8 hrs


Preoperative diagnosis


labor pain


Planned procedure


epidural





Past Medical History


Past Medical History:  Includes


Pregnancy:  Gestational age: (38)





Surgery & Anesthesia Issues


No known issue





Meds


Anticoagulation:  No


Beta Blocker within 24 hr:  No


Reason Beta Blocker not given:  Pt. not on B-Blocker


Reported Medications


Calcium Carbonate* (Calcium Carbonate*) 600 MG Ca Tab, 600 MG PO DAILY, TAB


   3/16/19


Ferrous Sulfate* (Ferrous Sulfate*) 325 Mg Tabec, 325 MG PO DAILY, TAB


   2/22/19


Prenatal Vit #76/Iron,Carb/FA (Pnv 29-1 Tablet) 1 Each Tablet, 1 EACH PO DAILY, 


TAB


   2/22/19





Current Medications


Lactated Ringer's 1,000 ml @  125 mls/hr Q8H IV  Last administered on 4/12/19at 


21:42; Admin Dose 125 MLS/HR;  Start 4/12/19 at 21:22


Butorphanol Tartrate (Stadol) 2 mg Q2H  PRN IV .PAIN;  Start 4/12/19 at 21:30


Lidocaine (Xylocaine 1% (Mpf)) 30 ml ONCE PRN INJ .EPISIOTOMY;  Start 4/12/19 at


21:30


Oxytocin/Lactated Ringer's 500 ml @  500 mls/hr ONCE POST PARTUM IV ;  Start 


4/12/19 at 21:30


Oxytocin/Lactated Ringer's 500 ml @  125 mls/hr POST PARTUM IV ;  Start 4/12/19 


at 21:30


Oxytocin/Lactated Ringer's 500 ml @ 0 mls/hr ONCE PRN IV .VAGINAL BLEEDING;  


Start 4/12/19 at 21:30


Methylergonovine Maleate (Methergine) 0.2 mg ONCE PRN IM .VAGINAL BLEEDING;  


Start 4/12/19 at 21:30


Carboprost Tromethamine (Hemabate) 250 mcg ONCE PRN IM .VAGINAL BLEEDING;  Start


4/12/19 at 21:30


Misoprostol (Cytotec) 1,000 mcg ONCE PRN [place] .VAGINAL BLEEDING;  Start 4/12/19 at


21:30


Mineral Oil (Muri-Lube) 20 ml ONCE PRN TOP FOR DELIVERY;  Start 4/12/19 at 21:30


Oxytocin/Lactated Ringer's 500 ml @ 0 mls/hr FOR AUGMENTATION IV  Last 


administered on 4/12/19at 22:56; Admin Dose 1 MLS/HR;  Start 4/12/19 at 22:00


Meds reviewed:  Yes





Allergies


Coded Allergies:  


     No Known Allergy (Unverified , 4/12/19)


Allergies Reviewed:  Yes





Labs/Studies


Labs Reviewed:  Reviewed by anesthesiologist


Result Diagram:  


4/12/19 2135





Laboratory Tests


4/12/19 21:35











Blood Bank


                  Test
                         4/12/19
21:35


                  Antibody Screen               NEGATIVE


                  Blood Type                    O POSITIVE


                  Rh Immune Globulin Candidate  NO





Pregnancy test:  Positive


Studies:  ECG (n/a), CXR (n/a)





Pre-procedure Exam


Airway:  Adequate mouth opening


Mallampati:  Mallampati I


Teeth:  Normal


Lung:  Normal


Heart:  Normal





ASA Physical Status


ASA physical status:  2


Emergency:  None





Planned Anesthetic


Neuraxial:  Epidural





Pre-operative Attestations


Prior to commencing anesthesia and surgery, the patient was re-evaluated, there 


was verification of:


*The patient's identity


*The results of appropriate recent lab work and preoperative vital signs


*The above evaluation not changing prior to induction


*Anesthetic plan, risk benefits, alternative and complications discussed with 


patient/family; questions answered; patient/family understands, accepts and 


wishes to proceed.











DYLAN BERGERON MD            Apr 13, 2019 00:07

## 2019-04-14 VITALS — HEART RATE: 65 BPM | SYSTOLIC BLOOD PRESSURE: 97 MMHG | DIASTOLIC BLOOD PRESSURE: 60 MMHG | RESPIRATION RATE: 18 BRPM

## 2019-04-14 VITALS — DIASTOLIC BLOOD PRESSURE: 69 MMHG | SYSTOLIC BLOOD PRESSURE: 108 MMHG | RESPIRATION RATE: 16 BRPM | HEART RATE: 78 BPM

## 2019-04-14 VITALS — RESPIRATION RATE: 18 BRPM | DIASTOLIC BLOOD PRESSURE: 64 MMHG | SYSTOLIC BLOOD PRESSURE: 100 MMHG | HEART RATE: 74 BPM

## 2019-04-14 VITALS — SYSTOLIC BLOOD PRESSURE: 107 MMHG | RESPIRATION RATE: 18 BRPM | HEART RATE: 76 BPM | DIASTOLIC BLOOD PRESSURE: 82 MMHG

## 2019-04-14 VITALS — HEART RATE: 73 BPM | RESPIRATION RATE: 18 BRPM | DIASTOLIC BLOOD PRESSURE: 64 MMHG | SYSTOLIC BLOOD PRESSURE: 104 MMHG

## 2019-04-14 LAB
ADD MAN DIFF?: NO
BASOPHIL #: 0 10^3/UL (ref 0–0.1)
BASOPHILS %: 0.3 % (ref 0–2)
EOSINOPHILS #: 0.1 10^3/UL (ref 0–0.5)
EOSINOPHILS %: 1 % (ref 0–7)
HEMATOCRIT: 29 % (ref 37–47)
HEMOGLOBIN: 9.2 G/DL (ref 12–16)
IMMATURE GRANS #M: 0.05 10^3/UL (ref 0–0.03)
IMMATURE GRANS % (M): 0.4 % (ref 0–0.43)
LYMPHOCYTES #: 3.8 10^3/UL (ref 0.8–2.9)
LYMPHOCYTES %: 31.7 % (ref 15–51)
MEAN CORPUSCULAR HEMOGLOBIN: 29.2 PG (ref 29–33)
MEAN CORPUSCULAR HGB CONC: 31.7 G/DL (ref 32–37)
MEAN CORPUSCULAR VOLUME: 92.1 FL (ref 82–101)
MEAN PLATELET VOLUME: 11.6 FL (ref 7.4–10.4)
MONOCYTE #: 1.2 10^3/UL (ref 0.3–0.9)
MONOCYTES %: 10 % (ref 0–11)
NEUTROPHIL #: 6.9 10^3/UL (ref 1.6–7.5)
NEUTROPHILS %: 56.6 % (ref 39–77)
NUCLEATED RED BLOOD CELLS #: 0 10^3/UL (ref 0–0)
NUCLEATED RED BLOOD CELLS%: 0 /100WBC (ref 0–0)
PLATELET COUNT: 193 10^3/UL (ref 140–415)
RED BLOOD COUNT: 3.15 10^6/UL (ref 4.2–5.4)
RED CELL DISTRIBUTION WIDTH: 14.2 % (ref 11.5–14.5)
WHITE BLOOD COUNT: 12.1 10^3/UL (ref 4.8–10.8)

## 2019-04-14 RX ADMIN — DOCUSATE SODIUM AND SENNOSIDES 1 TAB: 8.6; 5 TABLET, FILM COATED ORAL at 21:09

## 2019-04-14 RX ADMIN — OXYCODONE HYDROCHLORIDE AND ASPIRIN PRN TAB: 4.8355; 325 TABLET ORAL at 15:51

## 2019-04-14 RX ADMIN — OXYCODONE HYDROCHLORIDE AND ASPIRIN 1 TAB: 4.8355; 325 TABLET ORAL at 08:46

## 2019-04-14 RX ADMIN — DOCUSATE SODIUM AND SENNOSIDES SCH TAB: 8.6; 5 TABLET, FILM COATED ORAL at 21:09

## 2019-04-14 RX ADMIN — IBUPROFEN SCH MG: 600 TABLET ORAL at 06:22

## 2019-04-14 RX ADMIN — OXYCODONE HYDROCHLORIDE AND ASPIRIN 1 TAB: 4.8355; 325 TABLET ORAL at 15:51

## 2019-04-14 RX ADMIN — IBUPROFEN SCH MG: 600 TABLET ORAL at 12:16

## 2019-04-14 RX ADMIN — IBUPROFEN 1 MG: 600 TABLET ORAL at 18:00

## 2019-04-14 RX ADMIN — IBUPROFEN 1 MG: 600 TABLET ORAL at 00:19

## 2019-04-14 RX ADMIN — IBUPROFEN 1 MG: 600 TABLET ORAL at 12:16

## 2019-04-14 RX ADMIN — DOCUSATE SODIUM AND SENNOSIDES 1 TAB: 8.6; 5 TABLET, FILM COATED ORAL at 08:46

## 2019-04-14 RX ADMIN — IBUPROFEN 1 MG: 600 TABLET ORAL at 06:22

## 2019-04-14 RX ADMIN — IBUPROFEN SCH MG: 600 TABLET ORAL at 18:00

## 2019-04-14 RX ADMIN — DOCUSATE SODIUM AND SENNOSIDES SCH TAB: 8.6; 5 TABLET, FILM COATED ORAL at 08:46

## 2019-04-14 RX ADMIN — IBUPROFEN SCH MG: 600 TABLET ORAL at 00:19

## 2019-04-14 RX ADMIN — OXYCODONE HYDROCHLORIDE AND ASPIRIN PRN TAB: 4.8355; 325 TABLET ORAL at 08:46

## 2019-04-14 NOTE — QN
Documentation


Comment


PPD#2s stable afebrile tolerates diet No VB +BM +voids


VS stable


Gen NAD


Abd soft NT ND


Genitalia No blood at perineum


--->discharge home











MERLYN DE DIOS M.D.            Apr 14, 2019 15:21

## 2019-04-15 VITALS — RESPIRATION RATE: 18 BRPM | HEART RATE: 80 BPM | SYSTOLIC BLOOD PRESSURE: 102 MMHG | DIASTOLIC BLOOD PRESSURE: 60 MMHG

## 2019-04-15 VITALS — HEART RATE: 72 BPM | RESPIRATION RATE: 18 BRPM | SYSTOLIC BLOOD PRESSURE: 97 MMHG | DIASTOLIC BLOOD PRESSURE: 64 MMHG

## 2019-04-15 RX ADMIN — IBUPROFEN SCH MG: 600 TABLET ORAL at 12:02

## 2019-04-15 RX ADMIN — IBUPROFEN SCH MG: 600 TABLET ORAL at 06:01

## 2019-04-15 RX ADMIN — DOCUSATE SODIUM AND SENNOSIDES 1 TAB: 8.6; 5 TABLET, FILM COATED ORAL at 09:00

## 2019-04-15 RX ADMIN — DOCUSATE SODIUM AND SENNOSIDES SCH TAB: 8.6; 5 TABLET, FILM COATED ORAL at 09:00

## 2019-04-15 RX ADMIN — IBUPROFEN SCH MG: 600 TABLET ORAL at 00:10

## 2019-04-15 RX ADMIN — IBUPROFEN 1 MG: 600 TABLET ORAL at 12:02

## 2019-04-15 RX ADMIN — IBUPROFEN 1 MG: 600 TABLET ORAL at 06:01

## 2019-04-15 RX ADMIN — IBUPROFEN 1 MG: 600 TABLET ORAL at 00:10

## 2019-04-15 RX ADMIN — CLOSTRIDIUM TETANI TOXOID ANTIGEN (FORMALDEHYDE INACTIVATED), CORYNEBACTERIUM DIPHTHERIAE TOXOID ANTIGEN (FORMALDEHYDE INACTIVATED), BORDETELLA PERTUSSIS TOXOID ANTIGEN (GLUTARALDEHYDE INACTIVATED), BORDETELLA PERTUSSIS FILAMENTOUS HEMAGGLUTININ ANTIGEN (FORMALDEHYDE INACTIVATED), BORDETELLA PERTUSSIS PERTACTIN ANTIGEN, AND BORDETELLA PERTUSSIS FIMBRIAE 2/3 ANTIGEN 1 ML: 5; 2; 2.5; 5; 3; 5 INJECTION, SUSPENSION INTRAMUSCULAR at 13:31

## 2019-04-15 NOTE — DS
Date/Time of Note


Date/Time of Note


DATE: 4/15/19 


TIME: 09:46





Obstetrical Discharge Record


Final Diagnosis


Final Diagnosis:  Term delivered





Vaginal Delivery


Obstetrical Delivery:  Spontaneous





Condition on Discharge


Physical Assessment


Last Vitals:


stable afebrile


Voiding:  Yes


Bowel Movement:  Yes


Breast:  Soft, non-tender, Filling


Fundus:  Firm


Abdomen and Incision:


soft nt


Calf Tenderness:  No


Patient Condition:  Fair











FARZAD BRITTON MD           Apr 15, 2019 09:46

## 2019-04-15 NOTE — PD.PPDC
OB/GYN Discharge Instruction


Condition


                 [id number]
Patient Condition:  [id number]
Fair








Diet


                [id number]
Diet:  Eavfw0n
Resume Regular Diet








Activity/Restrictions


                [id number]
Activity:  Hyykj8p
Normal Activity


                                      May Shower








Follow-up


Follow-up with Physician:  3, Week/Weeks





Return to clinic for


      Hubel2Sn
GYN Instructions:       Wbnsp7i
Fever greater than 101


                                         Chills


                                         Worsening abdominal pain


                                         Excessive Vaginal Bleeding


                                         More than 2 pads per hour


                                         Unable to tolerate diet


      Clacg7Hg
OB Instructions:        Aruaa7i
Breast Tenderness


                                         Depression


                                         Blurried Vision


                                         Headache


      Rhuzl9Pr
Surgical Instructions:  Uaixh9l
Incisional Drainage


                                         Incisional Redness














FARZAD BRITTON MD           Apr 15, 2019 09:44